# Patient Record
Sex: FEMALE | Race: WHITE
[De-identification: names, ages, dates, MRNs, and addresses within clinical notes are randomized per-mention and may not be internally consistent; named-entity substitution may affect disease eponyms.]

---
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
## 2018-01-04 ENCOUNTER — HOSPITAL ENCOUNTER (OUTPATIENT)
Dept: HOSPITAL 61 - LAB | Age: 18
Discharge: HOME | End: 2018-01-04
Attending: PEDIATRICS
Payer: COMMERCIAL

## 2018-01-04 DIAGNOSIS — N39.0: Primary | ICD-10-CM

## 2018-01-04 LAB
ANION GAP SERPL CALC-SCNC: 8 MMOL/L (ref 6–14)
BLOOD UREA NITROGEN: 12 MG/DL (ref 7–20)
CALCIUM: 8.6 MG/DL (ref 8.5–10.1)
CHLORIDE: 103 MMOL/L (ref 98–107)
CO2 SERPL-SCNC: 31 MMOL/L (ref 21–32)
CREAT SERPL-MCNC: 0.9 MG/DL (ref 0.6–1)
GFR SERPLBLD BASED ON 1.73 SQ M-ARVRAT: 80.7 ML/MIN
GLUCOSE SERPL-MCNC: 99 MG/DL (ref 70–99)
POTASSIUM SERPL-SCNC: 4.2 MMOL/L (ref 3.5–5.1)
SODIUM: 142 MMOL/L (ref 136–145)

## 2018-01-04 PROCEDURE — 80048 BASIC METABOLIC PNL TOTAL CA: CPT

## 2018-01-04 PROCEDURE — 36415 COLL VENOUS BLD VENIPUNCTURE: CPT

## 2018-02-11 ENCOUNTER — HOSPITAL ENCOUNTER (EMERGENCY)
Dept: HOSPITAL 61 - ER | Age: 18
Discharge: HOME | End: 2018-02-11
Payer: COMMERCIAL

## 2018-02-11 DIAGNOSIS — G43.909: ICD-10-CM

## 2018-02-11 DIAGNOSIS — J06.9: Primary | ICD-10-CM

## 2018-02-11 DIAGNOSIS — F31.9: ICD-10-CM

## 2018-02-11 LAB
INFLUENZA A PATIENT: NEGATIVE
INFLUENZA B PATIENT: NEGATIVE
OBC FLU: (no result)

## 2018-02-11 PROCEDURE — 87880 STREP A ASSAY W/OPTIC: CPT

## 2018-02-11 PROCEDURE — 99284 EMERGENCY DEPT VISIT MOD MDM: CPT

## 2018-02-11 PROCEDURE — 87804 INFLUENZA ASSAY W/OPTIC: CPT

## 2018-02-11 PROCEDURE — 87070 CULTURE OTHR SPECIMN AEROBIC: CPT

## 2018-02-12 LAB
NEGATIVE OBC STREP: (no result)
POSITIVE OBC STREP: (no result)

## 2018-04-24 ENCOUNTER — HOSPITAL ENCOUNTER (EMERGENCY)
Dept: HOSPITAL 61 - ER | Age: 18
Discharge: HOME | End: 2018-04-24
Payer: COMMERCIAL

## 2018-04-24 DIAGNOSIS — Z88.5: ICD-10-CM

## 2018-04-24 DIAGNOSIS — Z88.8: ICD-10-CM

## 2018-04-24 DIAGNOSIS — E28.2: ICD-10-CM

## 2018-04-24 DIAGNOSIS — F31.9: ICD-10-CM

## 2018-04-24 DIAGNOSIS — N39.0: Primary | ICD-10-CM

## 2018-04-24 DIAGNOSIS — G43.909: ICD-10-CM

## 2018-04-24 DIAGNOSIS — F90.9: ICD-10-CM

## 2018-04-24 DIAGNOSIS — Z88.2: ICD-10-CM

## 2018-04-24 DIAGNOSIS — Z88.1: ICD-10-CM

## 2018-04-24 LAB
BACTERIA #/AREA URNS HPF: 0 /HPF
BILIRUBIN,URINE: NEGATIVE
CLARITY,URINE: CLEAR
COLOR,URINE: YELLOW
GLUCOSE,URINE: NEGATIVE MG/DL
NITRITE UR QL STRIP: NEGATIVE
PH,URINE: 5.5
PROTEIN,URINE: NEGATIVE MG/DL
RBC,URINE: >40 /HPF (ref 0–2)
SPECIFIC GRAVITY,URINE: 1.02
SQUAMOUS EPITHELIAL CELL,UR: (no result) /LPF
UROBILINOGEN,URINE: 0.2 MG/DL

## 2018-04-24 PROCEDURE — 96372 THER/PROPH/DIAG INJ SC/IM: CPT

## 2018-04-24 PROCEDURE — 81025 URINE PREGNANCY TEST: CPT

## 2018-04-24 PROCEDURE — 81001 URINALYSIS AUTO W/SCOPE: CPT

## 2018-04-24 PROCEDURE — 87491 CHLMYD TRACH DNA AMP PROBE: CPT

## 2018-04-24 PROCEDURE — 99284 EMERGENCY DEPT VISIT MOD MDM: CPT

## 2018-04-24 PROCEDURE — 87591 N.GONORRHOEAE DNA AMP PROB: CPT

## 2018-04-24 RX ADMIN — AZITHROMYCIN 1 MG: 250 TABLET, FILM COATED ORAL at 19:33

## 2018-04-24 RX ADMIN — CEFTRIAXONE 1 MG: 250 INJECTION, POWDER, FOR SOLUTION INTRAMUSCULAR; INTRAVENOUS at 19:34

## 2018-04-25 LAB — URINE HCG POC: (no result)

## 2018-06-01 ENCOUNTER — HOSPITAL ENCOUNTER (EMERGENCY)
Dept: HOSPITAL 61 - ER | Age: 18
Discharge: HOME | End: 2018-06-01
Payer: COMMERCIAL

## 2018-06-01 DIAGNOSIS — Z88.8: ICD-10-CM

## 2018-06-01 DIAGNOSIS — N39.0: Primary | ICD-10-CM

## 2018-06-01 DIAGNOSIS — F31.9: ICD-10-CM

## 2018-06-01 DIAGNOSIS — Z88.1: ICD-10-CM

## 2018-06-01 DIAGNOSIS — Z88.2: ICD-10-CM

## 2018-06-01 DIAGNOSIS — Z88.5: ICD-10-CM

## 2018-06-01 DIAGNOSIS — F90.9: ICD-10-CM

## 2018-06-01 DIAGNOSIS — G43.909: ICD-10-CM

## 2018-06-01 LAB
BACTERIA,URINE: (no result) /HPF
BILIRUBIN,URINE: NEGATIVE
CLARITY,URINE: CLEAR
COLOR,URINE: YELLOW
GLUCOSE,URINE: NEGATIVE MG/DL
NITRITE,URINE: NEGATIVE
PH,URINE: 5.5
PROTEIN,URINE: NEGATIVE MG/DL
RBC,URINE: (no result) /HPF (ref 0–2)
SPECIFIC GRAVITY,URINE: 1.02
SQUAMOUS EPITHELIAL CELL,UR: (no result) /LPF
URINE HCG POC: (no result)
UROBILINOGEN,URINE: 0.2 MG/DL
WBC,URINE: (no result) /HPF (ref 0–4)

## 2018-06-01 PROCEDURE — 87086 URINE CULTURE/COLONY COUNT: CPT

## 2018-06-01 PROCEDURE — 81001 URINALYSIS AUTO W/SCOPE: CPT

## 2018-06-01 PROCEDURE — 81025 URINE PREGNANCY TEST: CPT

## 2018-06-01 PROCEDURE — 99284 EMERGENCY DEPT VISIT MOD MDM: CPT

## 2018-09-22 ENCOUNTER — HOSPITAL ENCOUNTER (EMERGENCY)
Dept: HOSPITAL 61 - ER | Age: 18
Discharge: HOME | End: 2018-09-22
Payer: COMMERCIAL

## 2018-09-22 VITALS — SYSTOLIC BLOOD PRESSURE: 153 MMHG | DIASTOLIC BLOOD PRESSURE: 80 MMHG

## 2018-09-22 VITALS — BODY MASS INDEX: 56.15 KG/M2 | HEIGHT: 60 IN | WEIGHT: 286 LBS

## 2018-09-22 DIAGNOSIS — N30.00: Primary | ICD-10-CM

## 2018-09-22 DIAGNOSIS — F90.9: ICD-10-CM

## 2018-09-22 DIAGNOSIS — Z88.6: ICD-10-CM

## 2018-09-22 DIAGNOSIS — F31.9: ICD-10-CM

## 2018-09-22 DIAGNOSIS — Z88.8: ICD-10-CM

## 2018-09-22 DIAGNOSIS — Z88.2: ICD-10-CM

## 2018-09-22 DIAGNOSIS — G43.909: ICD-10-CM

## 2018-09-22 LAB
APTT PPP: (no result) S
BACTERIA #/AREA URNS HPF: (no result) /HPF
BILIRUB UR QL STRIP: NEGATIVE
FIBRINOGEN PPP-MCNC: (no result) MG/DL
NITRITE UR QL STRIP: POSITIVE
PH UR STRIP: 7 [PH]
PROT UR STRIP-MCNC: NEGATIVE MG/DL
RBC #/AREA URNS HPF: 0 /HPF (ref 0–2)
SQUAMOUS #/AREA URNS LPF: (no result) /LPF
UROBILINOGEN UR-MCNC: 0.2 MG/DL

## 2018-09-22 PROCEDURE — 87086 URINE CULTURE/COLONY COUNT: CPT

## 2018-09-22 PROCEDURE — 87186 SC STD MICRODIL/AGAR DIL: CPT

## 2018-09-22 PROCEDURE — 81025 URINE PREGNANCY TEST: CPT

## 2018-09-22 PROCEDURE — 81001 URINALYSIS AUTO W/SCOPE: CPT

## 2018-09-22 PROCEDURE — 99284 EMERGENCY DEPT VISIT MOD MDM: CPT

## 2018-09-22 NOTE — PHYS DOC
Past Medical History


Past Medical History:  Bipolar, Migraines, UTI


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 

x 10 years, PCOS,


Past Surgical History:  Other


Additional Past Surgical Histo:  Laparoscopy


Alcohol Use:  None


Drug Use:  None





Adult General


Chief Complaint


Chief Complaint:  PAIN ON URINATION





[facility]


HPI





Patient is a 18  year old F who presents with lower back pain.  Patient reports 

she was here a couple of weeks ago for same sx and dx with UTI.  She was 

treated with keflex.  She did get a yeast infection, but treated this with otc 

monostat and those symptoms resolved as well.  She reports her dysuria and back 

pain did not improve.  She states she thinks doxycycline works the best for 

these episodes.





Review of Systems


Review of Systems





Constitutional: Denies fever or chills []


Respiratory: Denies cough or shortness of breath []


Cardiovascular: No additional information not addressed in HPI []


GI: Denies abdominal pain, nausea, vomiting


: Reports dysuria.  Denies hematuria


Musculoskeletal: Reports lower back pain x3 years


Integument: Denies rash or skin lesions []


Neurologic: Denies headache, focal weakness or sensory changes []





All other systems were reviewed and found to be within normal limits, except as 

documented in this note.





Current Medications


Current Medications





Current Medications








 Medications


  (Trade)  Dose


 Ordered  Sig/[facility]  Start Time


 Stop Time Status Last Admin


Dose Admin


 


 Acetaminophen


  (Tylenol)  1,000 mg  1X  ONCE  9/22/18 12:30


 9/22/18 12:31 DC 9/22/18 12:34


1,000 MG


 


 Phenazopyridine


 HCl


  (Pyridium)  100 mg  1X  ONCE  9/22/18 12:30


 9/22/18 12:31 DC 9/22/18 12:34


100 MG











Allergies


Allergies





Allergies








Coded Allergies Type Severity Reaction Last Updated Verified


 


  ibuprofen Allergy Intermediate  2/3/17 Yes


 


  sulfamethoxazole Allergy Intermediate  9/22/18 Yes


 


  trimethoprim Allergy Intermediate  9/22/18 Yes











Physical Exam


Physical Exam





Constitutional: Well developed, well nourished, no acute distress, non-toxic 

appearance. []


HENT: Normocephalic, atraumatic


Eyes: PERRLA, EOMI, conjunctiva normal, no discharge. [] 


Neck: Normal range of motion, no tenderness, supple, no stridor. [] 


Skin: Warm, dry, no erythema, no rash. [] 


Back: Lower back tenderness, no CVA tenderness


Extremities: No tenderness, ROM intact


Neurologic: Alert and oriented X 3, normal motor function, normal sensory 

function, no focal deficits noted. []


Psychologic: Affect normal, judgement normal, mood normal. []





Current Patient Data


Vital Signs





 Vital Signs








  Date Time  Temp Pulse Resp B/P (MAP) Pulse Ox O2 Delivery O2 Flow Rate FiO2


 


9/22/18 12:00 98.8 77 18 153/80 (104) 98 Room Air  





 98.8       








Lab Values





 Laboratory Tests








Test


 9/22/18


12:00 9/22/18


12:08


 


Urine Collection Type Unknown   


 


Urine Color Josy   


 


Urine Clarity Cloudy   


 


Urine pH 7.0   


 


Urine Specific Gravity 1.025   


 


Urine Protein


 Negative mg/dL


(NEG-TRACE) 





 


Urine Glucose (UA)


 Negative mg/dL


(NEG) 





 


Urine Ketones (Stick)


 Negative mg/dL


(NEG) 





 


Urine Blood


 Negative (NEG)


 





 


Urine Nitrite


 Positive (NEG)


 





 


Urine Bilirubin


 Negative (NEG)


 





 


Urine Urobilinogen Dipstick


 0.2 mg/dL (0.2


mg/dL) 





 


Urine Leukocyte Esterase Large (NEG)   


 


Urine RBC 0 /HPF (0-2)   


 


Urine WBC


 11-20 /HPF


(0-4) 





 


Urine Squamous Epithelial


Cells Many /LPF  


 





 


Urine Bacteria


 Many /HPF


(0-FEW) 





 


POC Urine HCG, Qualitative


 


 Hcg negative


(Negative)











EKG


EKG


[]





Radiology/Procedures


Radiology/Procedures


[]





Course & Med Decision Making


Course & Med Decision Making


Pertinent Labs and Imaging studies reviewed. (See chart for details)





Plan:  doxycycline rx, f/u with PCP, return precautions reviewed





Dragon Disclaimer


Dragon Disclaimer


This electronic medical record was generated, in whole or in part, using a 

voice recognition dictation system.





Departure


Departure


Impression:  


 Primary Impression:  


 UTI (urinary tract infection)


Disposition:  01 HOME, SELF-CARE


Condition:  GOOD


Referrals:  


YENI RECIO MD (PCP)


Patient Instructions:  Urinary Tract Infection


Scripts


Doxycycline Hyclate (DOXYCYCLINE HYCLATE) 100 Mg Capsule


1 CAP PO BID, #14 CAP


   Prov: KT DE SOUZA         9/22/18





Problem Qualifiers








 Primary Impression:  


 UTI (urinary tract infection)


 Urinary tract infection type:  acute cystitis  Hematuria presence:  without 

hematuria  Qualified Codes:  N30.00 - Acute cystitis without hematuria








KT DE SOUZA Sep 22, 2018 12:29

## 2018-11-01 ENCOUNTER — HOSPITAL ENCOUNTER (EMERGENCY)
Dept: HOSPITAL 61 - ER | Age: 18
Discharge: HOME | End: 2018-11-01
Payer: COMMERCIAL

## 2018-11-01 VITALS — SYSTOLIC BLOOD PRESSURE: 133 MMHG | DIASTOLIC BLOOD PRESSURE: 82 MMHG

## 2018-11-01 VITALS — HEIGHT: 60 IN | BODY MASS INDEX: 56.93 KG/M2 | WEIGHT: 290 LBS

## 2018-11-01 DIAGNOSIS — Z88.2: ICD-10-CM

## 2018-11-01 DIAGNOSIS — F31.9: ICD-10-CM

## 2018-11-01 DIAGNOSIS — Z20.2: ICD-10-CM

## 2018-11-01 DIAGNOSIS — Z88.6: ICD-10-CM

## 2018-11-01 DIAGNOSIS — E28.2: ICD-10-CM

## 2018-11-01 DIAGNOSIS — G43.909: ICD-10-CM

## 2018-11-01 DIAGNOSIS — Z88.1: ICD-10-CM

## 2018-11-01 DIAGNOSIS — Z87.440: ICD-10-CM

## 2018-11-01 DIAGNOSIS — N30.00: Primary | ICD-10-CM

## 2018-11-01 LAB
APTT PPP: YELLOW S
BACTERIA #/AREA URNS HPF: (no result) /HPF
BILIRUB UR QL STRIP: NEGATIVE
FIBRINOGEN PPP-MCNC: CLEAR MG/DL
NITRITE UR QL STRIP: NEGATIVE
PH UR STRIP: 5 [PH]
PROT UR STRIP-MCNC: NEGATIVE MG/DL
RBC #/AREA URNS HPF: (no result) /HPF (ref 0–2)
SQUAMOUS #/AREA URNS LPF: (no result) /LPF
UROBILINOGEN UR-MCNC: 0.2 MG/DL
WBC #/AREA URNS HPF: (no result) /HPF (ref 0–4)

## 2018-11-01 PROCEDURE — 96372 THER/PROPH/DIAG INJ SC/IM: CPT

## 2018-11-01 PROCEDURE — 81001 URINALYSIS AUTO W/SCOPE: CPT

## 2018-11-01 PROCEDURE — 87491 CHLMYD TRACH DNA AMP PROBE: CPT

## 2018-11-01 PROCEDURE — 87086 URINE CULTURE/COLONY COUNT: CPT

## 2018-11-01 PROCEDURE — 87591 N.GONORRHOEAE DNA AMP PROB: CPT

## 2018-11-01 PROCEDURE — 81025 URINE PREGNANCY TEST: CPT

## 2018-11-01 PROCEDURE — 99284 EMERGENCY DEPT VISIT MOD MDM: CPT

## 2018-11-01 NOTE — PHYS DOC
Past Medical History


Past Medical History:  Bipolar, Migraines, UTI


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 

x 10 years, PCOS,


Past Surgical History:  Other


Additional Past Surgical Histo:  Laparoscopy


Alcohol Use:  None


Drug Use:  None





Adult General


Chief Complaint


Chief Complaint:  PAIN ON URINATION





[facility]


HPI





Patient is a 18  year old female with history of migraines, bipolar, who 

presents today complaining of dysuria for 1 week. Patient denies any fever. She 

is also concerned about STDs and would like to be tested and treated. Denies 

any abdominal pain nausea or vomiting. Denies any chance she is pregnant.





Review of Systems


Review of Systems





Constitutional: Denies fever or chills []


Eyes: Denies change in visual acuity, redness, or eye pain []


HENT: Denies nasal congestion or sore throat []


Respiratory: Denies cough or shortness of breath []


Cardiovascular: No additional information not addressed in HPI []


GI: Denies abdominal pain, nausea, vomiting, bloody stools or diarrhea []


: Reports dysuria, and concern for STDs denies hematuria []


Musculoskeletal: Denies back pain or joint pain []


Integument: Denies rash or skin lesions []


Neurologic: Denies headache, focal weakness or sensory changes []








All other systems were reviewed and found to be within normal limits, except as 

documented in this note.





Current Medications


Current Medications





Current Medications








 Medications


  (Trade)  Dose


 Ordered  Sig/Georgie  Start Time


 Stop Time Status Last Admin


Dose Admin


 


 Azithromycin


  (Zithromax)  1,000 mg  1X  ONCE  11/1/18 14:45


 11/1/18 14:46 DC 11/1/18 14:57


1,000 MG


 


 Ceftriaxone Sodium


  (Rocephin Im)  250 mg  1X  ONCE  11/1/18 14:45


 11/1/18 14:46 DC 11/1/18 14:58


250 MG


 


 Metronidazole


  (Flagyl)  2,000 mg  1X  ONCE  11/1/18 14:45


 11/1/18 14:46 DC 11/1/18 14:57


2,000 MG











Allergies


Allergies





Allergies








Coded Allergies Type Severity Reaction Last Updated Verified


 


  ibuprofen Allergy Intermediate  2/3/17 Yes


 


  sulfamethoxazole Allergy Intermediate  9/22/18 Yes


 


  trimethoprim Allergy Intermediate  9/22/18 Yes











Physical Exam


Physical Exam





Constitutional: Well developed, well nourished, no acute distress, non-toxic 

appearance. []


HENT: Normocephalic, atraumatic, bilateral external ears normal, oropharynx 

moist, no oral exudates, nose normal. []


Eyes: PERRLA, EOMI, conjunctiva normal, no discharge. [] 


Neck: Normal range of motion, no tenderness, supple, no stridor. [] 


Cardiovascular:Heart rate regular rhythm, no murmur []


Lungs & Thorax:  Bilateral breath sounds clear to auscultation []


Abdomen: Bowel sounds normal, soft, no tenderness, no masses, no pulsatile 

masses. [] 


Skin: Warm, dry, no erythema, no rash. [] 


Back: No tenderness, no CVA tenderness. [] 


Extremities: No tenderness, no cyanosis, no clubbing, ROM intact, no edema. [] 


Neurologic: Alert and oriented X 3, normal motor function, normal sensory 

function, no focal deficits noted. []


Psychologic: Affect normal, judgement normal, mood normal. []





Current Patient Data


Vital Signs





 Vital Signs








  Date Time  Temp Pulse Resp B/P (MAP) Pulse Ox O2 Delivery O2 Flow Rate FiO2


 


11/1/18 15:00 97.7 74 18 133/82 (99) 98 Room Air  





 97.7       








Lab Values





 Laboratory Tests








Test


 11/1/18


14:30


 


Urine Collection Type Unknown  


 


Urine Color Yellow  


 


Urine Clarity Clear  


 


Urine pH 5.0  


 


Urine Specific Gravity 1.025  


 


Urine Protein


 Negative mg/dL


(NEG-TRACE)


 


Urine Glucose (UA)


 Negative mg/dL


(NEG)


 


Urine Ketones (Stick)


 Negative mg/dL


(NEG)


 


Urine Blood Large (NEG)  


 


Urine Nitrite


 Negative (NEG)





 


Urine Bilirubin


 Negative (NEG)





 


Urine Urobilinogen Dipstick


 0.2 mg/dL (0.2


mg/dL)


 


Urine Leukocyte Esterase


 Moderate (NEG)





 


Urine RBC


 20-40 /HPF


(0-2)


 


Urine WBC


 20-40 /HPF


(0-4)


 


Urine Squamous Epithelial


Cells Many /LPF  





 


Urine Bacteria


 Moderate /HPF


(0-FEW)











EKG


EKG


[]





Radiology/Procedures


Radiology/Procedures


[]





Course & Med Decision Making


Course & Med Decision Making


Pertinent Labs and Imaging studies reviewed. (See chart for details)





This is a 18-year-old female patient presenting to the ED today with dysuria 

and concern for STDs for one week. Patient was given prophylaxis treatment. 

Urine noted for UTI, discharged with Macrobid and pyridium. Follow-up with PCP 

in 1-2 weeks. Instructed to push fluids.





Dragon Disclaimer


Dragon Disclaimer


This electronic medical record was generated, in whole or in part, using a 

voice recognition dictation system.





Departure


Departure


Impression:  


 Primary Impression:  


 Concern about STD in female without diagnosis


 Additional Impression:  


 UTI (urinary tract infection)


Disposition:  01 HOME, SELF-CARE


Condition:  STABLE


Referrals:  


YENI RECIO MD (PCP)


Follow-up in 1-2 weeks


Patient Instructions:  Urinary Tract Infection





Additional Instructions:  


You were treated for sexually transmitted diseases in the emergency room. You 

also have urinary tract infection. Complete your antibiotics. Let your partners 

know you were treated for STDs and ask them to seek treatment too. Use 

protection at all times. Follow-up with your own doctor in 1-2 weeks.


Scripts


Phenazopyridine Hcl (PYRIDIUM) 100 Mg Tablet


100 MG PO TID, #9 TAB


   Prov: JORGE PRATT         11/1/18 


Nitrofurantoin Monohyd/M-Cryst (MACROBID 100 MG CAPSULE) 100 Mg Capsule


1 CAP PO BID, #14 CAP


   Prov: JORGE PRATT         11/1/18





Problem Qualifiers








 Additional Impression:  


 UTI (urinary tract infection)


 Urinary tract infection type:  acute cystitis  Hematuria presence:  without 

hematuria  Qualified Codes:  N30.00 - Acute cystitis without hematuria








JORGE PRATT Nov 1, 2018 15:04

## 2019-01-07 ENCOUNTER — HOSPITAL ENCOUNTER (EMERGENCY)
Dept: HOSPITAL 61 - ER | Age: 19
Discharge: HOME | End: 2019-01-07
Payer: COMMERCIAL

## 2019-01-07 VITALS — SYSTOLIC BLOOD PRESSURE: 133 MMHG | DIASTOLIC BLOOD PRESSURE: 82 MMHG

## 2019-01-07 VITALS — BODY MASS INDEX: 56.15 KG/M2 | HEIGHT: 60 IN | WEIGHT: 286 LBS

## 2019-01-07 DIAGNOSIS — F31.9: ICD-10-CM

## 2019-01-07 DIAGNOSIS — R10.84: ICD-10-CM

## 2019-01-07 DIAGNOSIS — N92.6: ICD-10-CM

## 2019-01-07 DIAGNOSIS — G43.909: ICD-10-CM

## 2019-01-07 DIAGNOSIS — Z88.1: ICD-10-CM

## 2019-01-07 DIAGNOSIS — Z20.2: Primary | ICD-10-CM

## 2019-01-07 DIAGNOSIS — Z88.2: ICD-10-CM

## 2019-01-07 DIAGNOSIS — N89.8: ICD-10-CM

## 2019-01-07 DIAGNOSIS — R11.0: ICD-10-CM

## 2019-01-07 DIAGNOSIS — Z88.8: ICD-10-CM

## 2019-01-07 DIAGNOSIS — F90.9: ICD-10-CM

## 2019-01-07 LAB
APTT PPP: YELLOW S
BACTERIA #/AREA URNS HPF: (no result) /HPF
BILIRUB UR QL STRIP: NEGATIVE
FIBRINOGEN PPP-MCNC: CLEAR MG/DL
NITRITE UR QL STRIP: NEGATIVE
PH UR STRIP: 5.5 [PH]
PROT UR STRIP-MCNC: NEGATIVE MG/DL
RBC #/AREA URNS HPF: (no result) /HPF (ref 0–2)
SQUAMOUS #/AREA URNS LPF: (no result) /LPF
UROBILINOGEN UR-MCNC: 0.2 MG/DL
WBC #/AREA URNS HPF: (no result) /HPF (ref 0–4)

## 2019-01-07 PROCEDURE — 87491 CHLMYD TRACH DNA AMP PROBE: CPT

## 2019-01-07 PROCEDURE — 99283 EMERGENCY DEPT VISIT LOW MDM: CPT

## 2019-01-07 PROCEDURE — 81001 URINALYSIS AUTO W/SCOPE: CPT

## 2019-01-07 PROCEDURE — 87591 N.GONORRHOEAE DNA AMP PROB: CPT

## 2019-01-07 PROCEDURE — 81025 URINE PREGNANCY TEST: CPT
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
## 2019-03-07 ENCOUNTER — HOSPITAL ENCOUNTER (EMERGENCY)
Dept: HOSPITAL 61 - ER | Age: 19
Discharge: HOME | End: 2019-03-07
Payer: COMMERCIAL

## 2019-03-07 VITALS — SYSTOLIC BLOOD PRESSURE: 154 MMHG | DIASTOLIC BLOOD PRESSURE: 85 MMHG

## 2019-03-07 VITALS — WEIGHT: 286 LBS | HEIGHT: 60 IN | BODY MASS INDEX: 56.15 KG/M2

## 2019-03-07 DIAGNOSIS — Z88.2: ICD-10-CM

## 2019-03-07 DIAGNOSIS — Z88.8: ICD-10-CM

## 2019-03-07 DIAGNOSIS — Z88.1: ICD-10-CM

## 2019-03-07 DIAGNOSIS — N39.0: Primary | ICD-10-CM

## 2019-03-07 DIAGNOSIS — G43.909: ICD-10-CM

## 2019-03-07 DIAGNOSIS — F41.9: ICD-10-CM

## 2019-03-07 LAB
APTT PPP: YELLOW S
BACTERIA #/AREA URNS HPF: (no result) /HPF
BILIRUB UR QL STRIP: NEGATIVE
FIBRINOGEN PPP-MCNC: (no result) MG/DL
NITRITE UR QL STRIP: NEGATIVE
PH UR STRIP: 6.5 [PH]
PROT UR STRIP-MCNC: NEGATIVE MG/DL
RBC #/AREA URNS HPF: 0 /HPF (ref 0–2)
SQUAMOUS #/AREA URNS LPF: (no result) /LPF
UROBILINOGEN UR-MCNC: 0.2 MG/DL
WBC #/AREA URNS HPF: (no result) /HPF (ref 0–4)

## 2019-03-07 PROCEDURE — 81025 URINE PREGNANCY TEST: CPT

## 2019-03-07 PROCEDURE — 99283 EMERGENCY DEPT VISIT LOW MDM: CPT

## 2019-03-07 PROCEDURE — 81001 URINALYSIS AUTO W/SCOPE: CPT

## 2019-03-07 NOTE — PHYS DOC
Past Medical History


Past Medical History:  Bipolar, Migraines, UTI


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 

x 10 years, PCOS,


Past Surgical History:  Other


Additional Past Surgical Histo:  Laparoscopy


Alcohol Use:  None


Drug Use:  None





Adult General


Chief Complaint


Chief Complaint:  PAIN ON URINATION





[facility]


HPI





Patient is a 19  year old female presents to the ED complaining of dysuria 2 

days. Patient states she has a history of urinary tract infections. Describes 

the pain as burning. Rates the pain as 5 out of 10. Denies hematuria, flank pain

, fever, chills, abdominal pain, nausea/vomiting, vaginal discharge/bleeding.





Review of Systems


Review of Systems





Constitutional: Denies fever or chills []


Eyes: Denies change in visual acuity, redness, or eye pain []


HENT: Denies nasal congestion or sore throat []


Respiratory: Denies cough or shortness of breath []


Cardiovascular: No additional information not addressed in HPI []


GI: Denies abdominal pain, nausea, vomiting, bloody stools or diarrhea []


: Complains of dysuria. Denies hematuria []


Musculoskeletal: Denies back pain or joint pain []


Integument: Denies rash or skin lesions []


Neurologic: Denies headache, focal weakness or sensory changes []





All other systems were reviewed and found to be within normal limits, except as 

documented in this note.





Allergies


Allergies





Allergies








Coded Allergies Type Severity Reaction Last Updated Verified


 


  ibuprofen Allergy Intermediate  2/3/17 Yes


 


  sulfamethoxazole Allergy Intermediate  9/22/18 Yes


 


  trimethoprim Allergy Intermediate  9/22/18 Yes











Physical Exam


Physical Exam





Constitutional: Well developed, well nourished, no acute distress, non-toxic 

appearance. []


HENT: Normocephalic, atraumatic


Cardiovascular:Heart rate regular rhythm, no murmur []


Lungs & Thorax:  Bilateral breath sounds clear to auscultation []


Abdomen: Bowel sounds normal, soft, no tenderness, no masses, no pulsatile 

masses. [] 


Skin: Warm, dry, no erythema, no rash. [] 


Back: No tenderness, no CVA tenderness. [] 


Extremities: No tenderness, no cyanosis, no clubbing, ROM intact, no edema. [] 


Neurologic: Alert and oriented X 3, normal motor function, normal sensory 

function, no focal deficits noted. []


Psychologic: Affect normal, judgement normal, mood normal. []





Current Patient Data


Vital Signs





 Vital Signs








  Date Time  Temp Pulse Resp B/P (MAP) Pulse Ox O2 Delivery O2 Flow Rate FiO2


 


3/7/19 16:30 94.0 89 16 154/85 (108) 94 Room Air  





 94.0       








Lab Values





 Laboratory Tests








Test


 3/7/19


16:34 3/7/19


16:56


 


Urine Collection Type Unknown   


 


Urine Color Yellow   


 


Urine Clarity Cloudy   


 


Urine pH 6.5   


 


Urine Specific Gravity 1.020   


 


Urine Protein


 Negative mg/dL


(NEG-TRACE) 





 


Urine Glucose (UA)


 Negative mg/dL


(NEG) 





 


Urine Ketones (Stick)


 Negative mg/dL


(NEG) 





 


Urine Blood


 Negative (NEG)


 





 


Urine Nitrite


 Negative (NEG)


 





 


Urine Bilirubin


 Negative (NEG)


 





 


Urine Urobilinogen Dipstick


 0.2 mg/dL (0.2


mg/dL) 





 


Urine Leukocyte Esterase Trace (NEG)   


 


Urine RBC 0 /HPF (0-2)   


 


Urine WBC


 1-4 /HPF (0-4)


 





 


Urine Squamous Epithelial


Cells Mod /LPF  


 





 


Urine Bacteria


 Few /HPF


(0-FEW) 





 


Urine Mucus Slight /LPF   


 


POC Urine HCG, Qualitative


 


 Hcg negative


(Negative)











EKG


EKG


[]





Radiology/Procedures


Radiology/Procedures


[]





Course & Med Decision Making


Course & Med Decision Making


Pertinent Labs and Imaging studies reviewed. (See chart for details)





[]Discussed lab findings with patient. Will treat for urinary tract infection 

outpatient based on patient's symptoms. Patient denies STD exposure. No flank 

pain, chills or systemic symptoms. We'll treat with Keflex. Discussed follow-up 

and reasons to return to the ED. Patient understands and agrees with plan.





Dragon Disclaimer


Dragon Disclaimer


This electronic medical record was generated, in whole or in part, using a 

voice recognition dictation system.





Departure


Departure


Impression:  


 Primary Impression:  


 UTI (urinary tract infection)


Disposition:  01 HOME, SELF-CARE


Condition:  IMPROVED


Referrals:  


YENI RECIO MD (PCP)


Patient Instructions:  Urinary Tract Infection


Scripts


Cephalexin (KEFLEX) 500 Mg Capsule


1 CAP PO TID, #21 CAP


   Prov: JAZLYN RAJAN         3/7/19











JAZLYN RAJAN Mar 7, 2019 16:41

## 2019-04-27 ENCOUNTER — HOSPITAL ENCOUNTER (EMERGENCY)
Dept: HOSPITAL 61 - ER | Age: 19
Discharge: HOME | End: 2019-04-27
Payer: COMMERCIAL

## 2019-04-27 VITALS — BODY MASS INDEX: 57.52 KG/M2 | HEIGHT: 60 IN | WEIGHT: 293 LBS

## 2019-04-27 VITALS — SYSTOLIC BLOOD PRESSURE: 125 MMHG | DIASTOLIC BLOOD PRESSURE: 71 MMHG

## 2019-04-27 DIAGNOSIS — F10.20: ICD-10-CM

## 2019-04-27 DIAGNOSIS — G43.909: ICD-10-CM
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
## 2019-04-27 DIAGNOSIS — B96.89: ICD-10-CM

## 2019-04-27 DIAGNOSIS — Z88.2: ICD-10-CM

## 2019-04-27 DIAGNOSIS — Z88.1: ICD-10-CM

## 2019-04-27 DIAGNOSIS — F31.9: ICD-10-CM

## 2019-04-27 DIAGNOSIS — T74.21XA: Primary | ICD-10-CM

## 2019-04-27 DIAGNOSIS — N39.0: ICD-10-CM

## 2019-04-27 DIAGNOSIS — N76.0: ICD-10-CM

## 2019-04-27 DIAGNOSIS — D25.9: ICD-10-CM

## 2019-04-27 DIAGNOSIS — Z88.8: ICD-10-CM

## 2019-04-27 LAB
APTT PPP: YELLOW S
BACTERIA #/AREA URNS HPF: 0 /HPF
BILIRUB UR QL STRIP: NEGATIVE
FIBRINOGEN PPP-MCNC: CLEAR MG/DL
NITRITE UR QL STRIP: NEGATIVE
PH UR STRIP: 7 [PH]
PROT UR STRIP-MCNC: NEGATIVE MG/DL
RBC #/AREA URNS HPF: (no result) /HPF (ref 0–2)
SQUAMOUS #/AREA URNS LPF: (no result) /LPF
UROBILINOGEN UR-MCNC: 1 MG/DL

## 2019-04-27 PROCEDURE — 87086 URINE CULTURE/COLONY COUNT: CPT

## 2019-04-27 PROCEDURE — 76830 TRANSVAGINAL US NON-OB: CPT

## 2019-04-27 PROCEDURE — 87591 N.GONORRHOEAE DNA AMP PROB: CPT

## 2019-04-27 PROCEDURE — 87491 CHLMYD TRACH DNA AMP PROBE: CPT

## 2019-04-27 PROCEDURE — 76856 US EXAM PELVIC COMPLETE: CPT

## 2019-04-27 PROCEDURE — 99285 EMERGENCY DEPT VISIT HI MDM: CPT

## 2019-04-27 PROCEDURE — 96372 THER/PROPH/DIAG INJ SC/IM: CPT

## 2019-04-27 PROCEDURE — 81001 URINALYSIS AUTO W/SCOPE: CPT

## 2019-04-27 PROCEDURE — 81025 URINE PREGNANCY TEST: CPT

## 2019-04-27 NOTE — RAD
Examination: PELVIS W/TV

 

History: PELVIC PAIN, HISTORY OF FIBROID

 

Comparison/Correlation: 9/21/2016 abdomen and pelvis CT without contrast 

exam.

 

Findings: Transabdominal and transvaginal pelvic ultrasound exams were 

performed. Transvaginal technique was utilized to better assess the 

adnexal structures.

 

Uterus measures 13.8 cm x 8.1 cm x 7.2 cm. Diffuse fibroid involvement 

noted. There is a 6.6 cm x 5.5 cm 4.7 cm heterogeneous intermediate 

echogenicity structure centrally within the uterus which appears to 

involve the endometrial cavity.

 

Ovaries are not identified. No pelvic free fluid.

 

 

Impression:

Fibroid uterus.

 

Large heterogeneous intermediate echogenicity structure which appears to 

be present within the uterine cavity is noted. Correlate for possibility 

of submucosal fibroid or large polyp involvement. This finding appears be 

new upon correlation with previous CT exam of 9/21/2016.

 

Electronically signed by: Leonidas Lozada MD (4/27/2019 5:10 PM) [facility]

## 2019-08-17 ENCOUNTER — HOSPITAL ENCOUNTER (EMERGENCY)
Dept: HOSPITAL 61 - ER | Age: 19
Discharge: HOME | End: 2019-08-17
Payer: MEDICARE

## 2019-08-17 VITALS — BODY MASS INDEX: 57.52 KG/M2 | WEIGHT: 293 LBS | HEIGHT: 60 IN

## 2019-08-17 VITALS — DIASTOLIC BLOOD PRESSURE: 76 MMHG | SYSTOLIC BLOOD PRESSURE: 126 MMHG
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
## 2019-08-17 DIAGNOSIS — S93.401A: Primary | ICD-10-CM

## 2019-08-17 DIAGNOSIS — Z88.8: ICD-10-CM

## 2019-08-17 DIAGNOSIS — F10.20: ICD-10-CM

## 2019-08-17 DIAGNOSIS — Z88.2: ICD-10-CM

## 2019-08-17 DIAGNOSIS — Z88.1: ICD-10-CM
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
## 2019-08-17 DIAGNOSIS — G43.909: ICD-10-CM

## 2019-08-17 DIAGNOSIS — F31.9: ICD-10-CM

## 2019-08-17 PROCEDURE — 73610 X-RAY EXAM OF ANKLE: CPT

## 2019-08-17 PROCEDURE — L4350 ANKLE CONTROL ORTHO PRE OTS: HCPCS

## 2019-08-17 PROCEDURE — 29515 APPLICATION SHORT LEG SPLINT: CPT

## 2019-08-17 PROCEDURE — 73630 X-RAY EXAM OF FOOT: CPT

## 2019-08-17 PROCEDURE — 99284 EMERGENCY DEPT VISIT MOD MDM: CPT

## 2019-08-17 NOTE — RAD
EXAM:

3 views right foot

3 views right ankle

 

DATE: 8/17/2019 6:56 PM

 

INDICATION: pain s/p fall off horse

 

COMPARISON: No Prior

 

FINDINGS/

IMPRESSION:

1.  No evidence of acute fracture or dislocation. If there is persistent 

clinical concern for fracture, follow-up radiographs in 10-14 days is 

recommended.

2.  Ankle mortise is congruent. Talar dome is intact.

3.  Soft tissue swelling with radiopaque densities projecting over the 

lateral malleolus possibly retained foreign bodies or on the skin surface.

4.  On this nonweightbearing view, tarsometatarsal alignment is intact. If

there is clinical concern for Lisfranc injury, weightbearing radiographs 

and/or MRI is recommended.

 

Electronically signed by: Ethan Reddy MD (8/17/2019 7:42 PM) [facility]

## 2019-08-17 NOTE — RAD
EXAM:

3 views right foot

3 views right ankle

 

DATE: 8/17/2019 6:56 PM

 

INDICATION: pain s/p fall off horse

 

COMPARISON: No Prior

 

FINDINGS/

IMPRESSION:

1.  No evidence of acute fracture or dislocation. If there is persistent 

clinical concern for fracture, follow-up radiographs in 10-14 days is 

recommended.

2.  Ankle mortise is congruent. Talar dome is intact.

3.  Soft tissue swelling with radiopaque densities projecting over the 

lateral malleolus possibly retained foreign bodies or on the skin surface.

4.  On this nonweightbearing view, tarsometatarsal alignment is intact. If

there is clinical concern for Lisfranc injury, weightbearing radiographs 

and/or MRI is recommended.

 

Electronically signed by: Ethan Reddy MD (8/17/2019 7:42 PM) [facility]

## 2019-08-17 NOTE — PHYS DOC
Past Medical History


Past Medical History:  Bipolar, Migraines, UTI


Additional Past Medical Histor:  ADHD,SPINAL STENOSIS,benign cyst in her brain x

10 years, PCOS,


Past Surgical History:  Other


Additional Past Surgical Histo:  Laparoscopy


Alcohol Use:  Heavy


Drug Use:  None





Adult General


Chief Complaint


Chief Complaint:  LOWEREXTREMITY INJURY





HPI


HPI





Patient is a 19  year old [f__sex] who presents with []





Review of Systems


Review of Systems


Constitutional: Denies fever or chills 


Eyes: Denies redness or eye pain 


HENT: Denies nasal congestion or sore throat


Respiratory: Denies cough or shortness of breath 


Cardiovascular: Denies chest pain or palpitations


GI: Denies abdominal pain, nausea, or vomiting


: Denies dysuria or hematuria


Musculoskeletal: Denies back pain or joint pain


Integument: Denies rash or skin lesions 


Neurologic: Denies headache, focal weakness or sensory changes





Complete systems were reviewed and found to be within normal limits, except as 

documented in this note.





Allergies


Allergies





Allergies








Coded Allergies Type Severity Reaction Last Updated Verified


 


  ibuprofen Allergy Intermediate  2/3/17 Yes


 


  sulfamethoxazole Allergy Intermediate  9/22/18 Yes


 


  trimethoprim Allergy Intermediate  9/22/18 Yes











Physical Exam


Physical Exam


Constitutional: Well developed, well nourished, no acute distress, non-toxic 

appearance


HENT: Normocephalic, atraumatic, oropharynx moist


Eyes: PERRL, EOMI, conjunctiva normal, no discharge


Neck: Normal range of motion, no tenderness, supple


Cardiovascular: Heart rate normal, regular rhythm


Lungs & Thorax:  Bilateral breath sounds clear to auscultation, no wheezing


Abdomen: Soft, no tenderness


Skin: Warm, dry, no erythema, no rash


Back: No tenderness, no CVA tenderness


Extremities: No tenderness, ROM intact, no edema


Neurologic: Alert and oriented X 3, normal motor function, normal sensory 

function, no focal deficits noted


Psychologic: Affect normal, judgement normal, mood normal





Current Patient Data


Vital Signs





                                   Vital Signs








  Date Time  Temp Pulse Resp B/P (MAP) Pulse Ox O2 Delivery O2 Flow Rate FiO2


 


8/17/19 20:15  80 16 126/76 (93) 98 Room Air  


 


8/17/19 19:10 98.0       





 98.0       











EKG


EKG


[]





Radiology/Procedures


Radiology/Procedures


EXAM:


3 views right foot


3 views right ankle


 


DATE: 8/17/2019 6:56 PM


 


INDICATION: pain s/p fall off horse


 


COMPARISON: No Prior


 


FINDINGS/


IMPRESSION:


1.  No evidence of acute fracture or dislocation. If there is persistent 


clinical concern for fracture, follow-up radiographs in 10-14 days is 


recommended.


2.  Ankle mortise is congruent. Talar dome is intact.


3.  Soft tissue swelling with radiopaque densities projecting over the 


lateral malleolus possibly retained foreign bodies or on the skin surface.


4.  On this nonweightbearing view, tarsometatarsal alignment is intact. If


there is clinical concern for Lisfranc injury, weightbearing radiographs 


and/or MRI is recommended.


 


Electronically signed by: Ethan Reddy MD (8/17/2019 7:42 PM) [facility]





Course & Med Decision Making


Course & Med Decision Making


Pertinent Labs and Imaging studies reviewed. (See chart for details)





Patient stable for discharge with outpatient follow-up with PCP/orthopedics. 

Orthopedic referral provided. Discussed findings and plan with patient, who 

acknowledges understanding and agreement.





Dragon Disclaimer


Dragon Disclaimer


This electronic medical record was generated, in whole or in part, using a voice

 recognition dictation system.





Splinting


Splinting :  


   Location:  right ankle


   Pre-Made Type:  Ace bandage and Air splint


   Pre-Proc Neuro Vasc Exam:  normal


   Post-Proc Neuro Vasc Exam:  unchanged from pre-exam





Departure


Departure


Impression:  


   Primary Impression:  


   Ankle sprain


Disposition:  01 HOME, SELF-CARE


Condition:  STABLE


Referrals:  


ELEUTERIO WILLIS (PCP)








RON RICK MD


Patient Instructions:  Ankle Sprain, Easy-to-Read, Crutch Use, Easy-to-Read


Scripts


Hydrocodone/Apap 5-325 (NORCO 5-325 TABLET) 1 Each Tablet


0.5 TAB PO PRN Q6HRS PRN for PAIN, #8 TAB 0 Refills


   Prov: BAUTISTA CASE DO         8/17/19





Problem Qualifiers








   Primary Impression:  


   Ankle sprain


   Encounter type:  initial encounter  Involved ligament of ankle:  unspecified 

   ligament  Laterality:  right  Qualified Codes:  S93.401A - Sprain of 

   unspecified ligament of right ankle, initial encounter








BAUTISTA CASE DO             Aug 17, 2019 19:47